# Patient Record
Sex: FEMALE | Race: WHITE | Employment: FULL TIME | URBAN - NONMETROPOLITAN AREA
[De-identification: names, ages, dates, MRNs, and addresses within clinical notes are randomized per-mention and may not be internally consistent; named-entity substitution may affect disease eponyms.]

---

## 2022-06-08 NOTE — PROGRESS NOTES
In preparation for their surgical procedure above patient was screened for Obstructive Sleep Apnea (KINA) using the STOP-Bang Questionnaire by the Pre-Admission Testing department. This is a pre-surgical screening tool for patient safety and serves as a recommendation, this WILL NOT cause cancellation of surgery. STOP-Bang Questionnaire  * Do you currently see a pulmonologist?  No     If yes STOP, do not complete. Patient follows with Dr.     1.  Do you snore loudly (able to be heard in the next room)? No    2. Do you often feel tired or sleepy during the daytime? No       3. Has anyone ever told you that you stop breathing during your sleep? No    4. Do you have or are you being treated for high blood pressure? No      5. BMI more than 35? BMI (Calculated): 26.3        No    6. Age over 48 years? 48 y.o. No    7. Neck Circumference greater than 17 inches for male or 16 inches for female? Measured           (visits only)            Not Applicable    8. Gender Male? No      TOTAL SCORE: 0    KINA - Low Risk : Yes to 0 - 2 questions  KINA - Intermediate Risk : Yes to 3 - 4 questions  KINA - High Risk : Yes to 5 - 8 questions    Adapted from:   STOP Questionnaire: A Tool to Screen Patients for Obstructive Sleep Apnea   KAITLIN PeraltaC.P.C., Barbara Tracy M.B.B.S., Rosmery Hernandez M.D., [place] Ybarra. Mike Cool, Ph.D., AYUSH Lindquist.B.B.S., AYUSH Lane.Sc., Natalia Stout M.D., [facility]. MANISHA Devlin.P.C.    Anesthesiology 2008; 753:771-68 Copyright 2008, the [address] [employer], [facility].   ----------------------------------------------------------------------------------------------------------------

## 2022-06-08 NOTE — PROGRESS NOTES
NPO after midnight  [employer] and drivers license  Wear comfortable clean clothing  Do not bring jewelry  Shower night before and morning of surgery with a liquid antibacterial soap  Bring list of medications with dosage and how often taken  Follow all instructions given by your physician   needed at discharge  Please limit to 2 visitors for surgery  You must have a responsible adult with you day of surgery and for 24 hours after surgery  Please bring and wear mask  Call [phone] for any questions

## 2022-06-15 ENCOUNTER — HOSPITAL ENCOUNTER (OUTPATIENT)
Age: 47
Setting detail: OUTPATIENT SURGERY
Discharge: HOME | End: 2022-06-15
Attending: SPECIALIST | Admitting: SPECIALIST

## 2022-06-15 VITALS
DIASTOLIC BLOOD PRESSURE: 70 MMHG | TEMPERATURE: 97 F | HEART RATE: 74 BPM | BODY MASS INDEX: 25.69 KG/M2 | HEIGHT: 63 IN | RESPIRATION RATE: 18 BRPM | OXYGEN SATURATION: 98 % | WEIGHT: 145 LBS | SYSTOLIC BLOOD PRESSURE: 113 MMHG

## 2022-06-15 PROCEDURE — 3600000004 HC SURGERY LEVEL 4 BASE: Performed by: SPECIALIST

## 2022-06-15 PROCEDURE — 7100000010 HC PHASE II RECOVERY - FIRST 15 MIN: Performed by: SPECIALIST

## 2022-06-15 PROCEDURE — 99152 MOD SED SAME PHYS/QHP 5/>YRS: CPT | Performed by: SPECIALIST

## 2022-06-15 PROCEDURE — 3600000014 HC SURGERY LEVEL 4 ADDTL 15MIN: Performed by: SPECIALIST

## 2022-06-15 PROCEDURE — 2500000003 HC RX 250 WO HCPCS: Performed by: SPECIALIST

## 2022-06-15 PROCEDURE — 7100000011 HC PHASE II RECOVERY - ADDTL 15 MIN: Performed by: SPECIALIST

## 2022-06-15 PROCEDURE — 2709999900 HC NON-CHARGEABLE SUPPLY: Performed by: SPECIALIST

## 2022-06-15 PROCEDURE — 6370000000 HC RX 637 (ALT 250 FOR IP): Performed by: SPECIALIST

## 2022-06-15 PROCEDURE — 6360000002 HC RX W HCPCS: Performed by: SPECIALIST

## 2022-06-15 PROCEDURE — 99153 MOD SED SAME PHYS/QHP EA: CPT | Performed by: SPECIALIST

## 2022-06-15 RX ORDER — LIDOCAINE HYDROCHLORIDE AND EPINEPHRINE 10; 10 MG/ML; UG/ML
INJECTION, SOLUTION INFILTRATION; PERINEURAL PRN
Status: DISCONTINUED | OUTPATIENT
Start: 2022-06-15 | End: 2022-06-15 | Stop reason: ALTCHOICE

## 2022-06-15 RX ORDER — FENTANYL CITRATE 50 UG/ML
INJECTION, SOLUTION INTRAMUSCULAR; INTRAVENOUS PRN
Status: DISCONTINUED | OUTPATIENT
Start: 2022-06-15 | End: 2022-06-15 | Stop reason: ALTCHOICE

## 2022-06-15 RX ORDER — LIDOCAINE HYDROCHLORIDE AND EPINEPHRINE BITARTRATE 20; .01 MG/ML; MG/ML
INJECTION, SOLUTION SUBCUTANEOUS PRN
Status: DISCONTINUED | OUTPATIENT
Start: 2022-06-15 | End: 2022-06-15 | Stop reason: ALTCHOICE

## 2022-06-15 RX ORDER — MIDAZOLAM HYDROCHLORIDE 1 MG/ML
INJECTION INTRAMUSCULAR; INTRAVENOUS PRN
Status: DISCONTINUED | OUTPATIENT
Start: 2022-06-15 | End: 2022-06-15 | Stop reason: ALTCHOICE

## 2022-06-15 RX ORDER — SCOLOPAMINE TRANSDERMAL SYSTEM 1 MG/1
1 PATCH, EXTENDED RELEASE TRANSDERMAL
Status: DISCONTINUED | OUTPATIENT
Start: 2022-06-15 | End: 2022-06-15 | Stop reason: HOSPADM

## 2022-06-15 RX ADMIN — CEFAZOLIN 2000 MG: 10 INJECTION, POWDER, FOR SOLUTION INTRAVENOUS at 08:22

## 2022-06-15 ASSESSMENT — PAIN - FUNCTIONAL ASSESSMENT: PAIN_FUNCTIONAL_ASSESSMENT: NONE - DENIES PAIN

## 2022-06-15 NOTE — PROGRESS NOTES
7453- Pt to PACU phase 2 IV sedation, Jae's RN at bedside for report. Pt has steri strips to right and left temple by eye and bridge of nose. 1004- pt given cold compress per Dr Sandra Darnell. 1013- pt resting with cold compress on her eyes. Pt denies pain. Pt denies anything wanting to eat or drink. 1030- PT and family Nathalie at bedside given discharge instructions verbalized understanding. Cold compress and opthamalic baci sent home with pt. Pt reports has her medicine at home already. 1032- IV removed  4679- As pt sat up to get dressed, felt nauseated emesis bag given, pt has scope patch on to right shoulder blade pt aware it is on there. 1040- PT dressed feels ok to leave.   1043- Pt discharged walked out to car with this RN

## 2022-06-15 NOTE — ANESTHESIA PRE-OP
[facility]  Pre-Sedation/Analgesia History & Physical    Pt Name: Waylon Rojas  MRN: [id number]  YOB: 1975  Provider Performing Procedure: Leidy Vazquez MD   Primary Care Physician: No primary care provider on file. PRE-PROCEDURE   DNR-CCA/DNR-CC : No  Brief History/Pre-Procedure Diagnosis: Unacceptable cosmetic appearance of lower eyelids     MEDICAL HISTORY       has a past medical history of PONV (postoperative nausea and vomiting). SURGICAL HISTORY   has a past surgical history that includes Hysterectomy (2013) and lipoma resection (03/2022). ALLERGIES   Allergies as of 05/25/2022    (Not on File)       MEDICATIONS   Coumadin Use Last 7 Days No  Antiplatelet drug therapy use last 7 days  No  Other anticoagulant use last 7 days  No  Prior to Admission medications    Medication Sig Start Date End Date Taking? Authorizing Provider   MAGNESIUM PO Take by mouth daily   Yes Historical Provider, MD   Probiotic Product (PROBIOTIC PO) Take by mouth daily   Yes Historical Provider, MD     PHYSICAL:   Vitals:    06/15/22 0701   BP: 106/69   Pulse: 85   Resp: 16   Temp: 96.9 °F (36.1 °C)   SpO2: 98%     Mental Status: alert and oriented   Heart:  Regular rate and rhythm    Lungs:  Clear to ausculation   Abdomen: soft, non tender   PLANNED PROCEDURE   Bilateral lower blepharoplasty   SEDATION  Planned agent:fentanyl,versed  ASA Classification: 1  Class 1: A normal healthy patient  Class 2: Pt with mild to moderate systemic disease  Class 3: Severe systemic disease or disturbance  Class 4: Severe systemic disorders that are already life threatening. Class 5: Moribund pt with little chances of survival, for more than 24 hours. Mallampati I Airway Classification: 3    1. Pre-procedure diagnostic studies complete and results available. Comment:    2. Previous sedation/anesthesia experiences assessed. Comment:    3.  The patient is an appropriate candidate to undergo the planned procedure sedation and anesthesia. (Refer to nursing sedation/analgesia documentation record)  4. Formulation and discussion of sedation/procedure plan, risks, and expectations with patient and/or responsible adult completed. 5. Patient examined immediately prior to the procedure.  (Refer to nursing sedation/analgesia documentation record)    oJrdon Garcia MD  Electronically signed 6/15/2022 at 7:50 AM

## 2022-06-15 NOTE — ANESTHESIA POST-OP
[facility]  Sedation/Analgesia Post Sedation Record    Pt Name: Rubin Allan  MRN: [id number]  YOB: 1975  Procedure Performed By: Noe Pan MD   Primary Care Physician: No primary care provider on file. POST-PROCEDURE    Physicians/Assistants: Noe Pan MD  Procedure Performed: Bilateral lower blepharoplasty   Sedation/Anesthesia: Versed 6 mg IV,fentanyl 400 mcg IV  Estimated Blood Loss:     7  ml  Specimens Removed:  none     Disposition of Specimen: none     Complications: None Immediately appreciated     Post-procedure Diagnosis/Findings:      1. Unacceptable cosmetic appearance of lower eyelids treated by bilateral lower blepharoplasty         Recommendations:    1.  Transfer to PACU           Noe Pan MD  Electronically signed 6/15/2022 at 9:57 AM

## 2022-06-15 NOTE — H&P
[address] [address]  History and Physical Update    Pt Name: Itz Cam  MRN: [id number]  YOB: 1975  Date of evaluation: 6/15/2022    I have examined the patient and reviewed the H&P/Consult and there are no changes to the patient or plans.       Augustin France MD  Electronically signed 6/15/2022 at 6:53 AM

## 2022-06-15 NOTE — OP NOTE
Operative Note    Patient name: Kenny Sweeney Record Number: [id number]    Primary Care Physician: No primary care provider on file.  1975    Date of Procedure: 6/15/2022    Pre-operative Diagnosis:  Unacceptable cosmetic appearance. Post-operative Diagnosis: Same    Procedure Performed:   1. Bilateral lower blepharoplasties     Surgeons/Assistants: MD Candi Wallace PA-C/Macario Heredia DPM    Estimated Blood Loss: 13 ml    Complications: none immediately appreciated    Procedure: With the patient lying in the supine position and under adequate IV sedation per the anesthesia team. The area was prepped and draped in the standard surgical fashion. 13ml of 1% Lidocaine 1:100,000 with epinephrine solution was used to anesthetized both lower eyelids. The left side lateral canthal incision was made and skin/muscle flap was elevated with a subciliary incision being carried out near the medial canthus. The flap was elevated below the nasojugal groove with the septal reset being carried out with 6-0 silk sutures below the inferior orbital rim while the lower eyelid was retracted superiorly. With the mouth fully open and with upward gaze a conservative resection of the excess skin. Superior tunneling above the lateral canthal position allowed the muscle to be secured to the periosteum with 4-0 braided nylon suture fully correcting and supporting the lower eyelid. These incisions were then closed with 6-0 silk in simple interrupted fashion and 4-0 Monocryl suture placed in running subcuticular fashion. Identical incision, dissection, suspension and closure was performed in the right side. The patient had a very significant improvement of lower eyelids with no signs of ectropion/scleral show. The tails from the external sutures were secured with benzoin/steristrips. Ophthalmic antibiotic ointment was then applied to both incision lines.   The patient tolerated the procedure well and remained hemodynamically stable throughout the procedure and was quite comfortable throughout the operative course. Larry Wheatley MD  Electronically signed by me on 6/15/2022 at 10:07 AM  Operative Note      Patient: Shelia Brand  YOB: 1975  MRN: [id number]    Date of Procedure: 6/15/2022    Pre-Op Diagnosis: cosmetic    Post-Op Diagnosis: Same       Procedure(s):  Bilateral Lower Blepharoplasty with Septal Reset/Lateral Muscle Suspension    Surgeon(s):  Larry Wheatley MD    Assistant:   Physician Assistant: Lluvia Hayward PA-C  Resident: Jackie Vallecillo DPM    Anesthesia: IV Sedation    Estimated Blood Loss (mL): Minimal    Complications: None    Specimens:   * No specimens in log *    Implants:  * No implants in log *      Drains: * No LDAs found *    Findings: Unacceptable cosmetic appearance. Detailed Description of Procedure:   1.  Bilateral lower blepharoplasties     Electronically signed by Larry Wheatley MD on 6/15/2022 at 10:05 AM

## (undated) DEVICE — GOWN,SIRUS,NON REINFRCD,LARGE,SET IN SL: Brand: MEDLINE

## (undated) DEVICE — SUTURE PERMAHAND SZ 6-0 L18IN NONABSORBABLE BLK L11MM P-1 639G

## (undated) DEVICE — Device

## (undated) DEVICE — GLOVE ORANGE PI 7   MSG9070

## (undated) DEVICE — STANDARD HYPODERMIC NEEDLE,POLYPROPYLENE HUB: Brand: MONOJECT

## (undated) DEVICE — SUTURE PLN GUT SZ 5-0 L18IN ABSRB YELLOWISH TAN L13MM PC-1 1915G

## (undated) DEVICE — SUTURE MCRYL SZ 4-0 L18IN ABSRB UD P-3 L13MM 3/8 CIR PRIM Y494G

## (undated) DEVICE — SOLUTION IRRIG 1500ML STRL H2O USP POUR PLAS BTL

## (undated) DEVICE — SUTURE VCRL P-1 PRECIS PNT REV CUT 3/8 CIR 11MM 18 IN SZ J490G

## (undated) DEVICE — TUBING, SUCTION, 1/4" X 12', STRAIGHT: Brand: MEDLINE

## (undated) DEVICE — SUTURE ETHBND EXCEL SZ 3-0 L36IN NONABSORBABLE GRN L22MM X762H

## (undated) DEVICE — SHEET, ORTHO, SPLIT, STERILE: Brand: MEDLINE

## (undated) DEVICE — SOLUTION IV IRRIG POUR BRL 0.9% SODIUM CHL 2F7124

## (undated) DEVICE — SPONGE GZ W4XL4IN COT 12 PLY TYP VII WVN C FLD DSGN

## (undated) DEVICE — GLOVE SURG SZ 7.5 L11.73IN FNGR THK9.8MIL STRW LTX POLYMER

## (undated) DEVICE — 3M™ STERI-STRIP™ REINFORCED ADHESIVE SKIN CLOSURES, R1547, 1/2 IN X 4 IN (12 MM X 100 MM), 6 STRIPS/ENVELOPE: Brand: 3M™ STERI-STRIP™

## (undated) DEVICE — GLOVE SURG SZ 8 L11.77IN FNGR THK9.8MIL STRW LTX POLYMER

## (undated) DEVICE — PAD,EYE,1-5/8X2 5/8,STERILE,LF,1/PK: Brand: MEDLINE

## (undated) DEVICE — NON COATED ELECTROSURGICAL NEEDLE ELECTRODE, 2.75 INCH (7 CM): Brand: MEGADYNE

## (undated) DEVICE — SUTURE VCRL SZ 5-0 L18IN ABSRB UD P-2 L18MM 1/2 CIR PRIM J503G

## (undated) DEVICE — SUTURE MCRYL SZ 3-0 L27IN ABSRB UD L26MM SH 1/2 CIR Y416H

## (undated) DEVICE — SUTURE PROL SZ 5-0 L18IN NONABSORBABLE BLU L13MM P-3 3/8 8698G

## (undated) DEVICE — PACK PROCEDURE SURG PLAS SC MIN SRHP LF